# Patient Record
Sex: FEMALE | Race: WHITE | HISPANIC OR LATINO
[De-identification: names, ages, dates, MRNs, and addresses within clinical notes are randomized per-mention and may not be internally consistent; named-entity substitution may affect disease eponyms.]

---

## 2020-03-06 PROBLEM — Z00.00 ENCOUNTER FOR PREVENTIVE HEALTH EXAMINATION: Status: ACTIVE | Noted: 2020-03-06

## 2020-03-10 ENCOUNTER — TRANSCRIPTION ENCOUNTER (OUTPATIENT)
Age: 50
End: 2020-03-10

## 2020-03-10 ENCOUNTER — APPOINTMENT (OUTPATIENT)
Dept: ORTHOPEDIC SURGERY | Facility: CLINIC | Age: 50
End: 2020-03-10
Payer: COMMERCIAL

## 2020-03-10 VITALS
HEART RATE: 65 BPM | DIASTOLIC BLOOD PRESSURE: 79 MMHG | BODY MASS INDEX: 29.16 KG/M2 | HEIGHT: 65 IN | WEIGHT: 175 LBS | SYSTOLIC BLOOD PRESSURE: 131 MMHG

## 2020-03-10 DIAGNOSIS — Z78.9 OTHER SPECIFIED HEALTH STATUS: ICD-10-CM

## 2020-03-10 PROCEDURE — 99203 OFFICE O/P NEW LOW 30 MIN: CPT | Mod: 25

## 2020-03-10 PROCEDURE — 20550 NJX 1 TENDON SHEATH/LIGAMENT: CPT | Mod: F2

## 2020-03-10 RX ORDER — ATORVASTATIN CALCIUM 80 MG/1
TABLET, FILM COATED ORAL
Refills: 0 | Status: ACTIVE | COMMUNITY

## 2020-03-10 RX ORDER — LIDOCAINE HYDROCHLORIDE 10 MG/ML
1 INJECTION, SOLUTION INFILTRATION; PERINEURAL
Refills: 0 | Status: COMPLETED | OUTPATIENT
Start: 2020-03-10

## 2020-03-10 RX ORDER — UBIDECARENONE/VIT E ACET 100MG-5
CAPSULE ORAL
Refills: 0 | Status: ACTIVE | COMMUNITY

## 2020-03-10 RX ORDER — AMLODIPINE BESYLATE 5 MG/1
TABLET ORAL
Refills: 0 | Status: ACTIVE | COMMUNITY

## 2020-03-10 RX ORDER — BETAMETHA AC,SOD PHOS/WATER/PF 6 MG/ML
6 (3-3) VIAL (ML) INJECTION
Qty: 1 | Refills: 0 | Status: COMPLETED | OUTPATIENT
Start: 2020-03-10

## 2020-03-10 RX ADMIN — BETAMETHASONE ACETATE AND BETAMETHASONE SODIUM PHOSPHATE 1 MG/ML: 3; 3 INJECTION, SUSPENSION INTRA-ARTICULAR; INTRALESIONAL; INTRAMUSCULAR; SOFT TISSUE at 00:00

## 2020-03-10 RX ADMIN — LIDOCAINE HYDROCHLORIDE 0.5 %: 10 INJECTION, SOLUTION INFILTRATION; PERINEURAL at 00:00

## 2020-03-10 NOTE — DISCUSSION/SUMMARY
[FreeTextEntry1] : She has findings consistent with a left middle finger trigger finger.  \par \par I had a discussion regarding today's visit, the diagnosis, and treatment recommendations / options.  I recommended a cortisone injection today.\par \par The patient has agreed to this plan of management and has expressed full understanding.  All questions were fully answered to the patient's satisfaction.\par \par Over 50% of the time spent with the patient was on counseling the patient on the above diagnosis, treatment plan and prognosis.

## 2020-03-10 NOTE — HISTORY OF PRESENT ILLNESS
[Left] : left hand dominant [FreeTextEntry1] : She comes in today for evaluation of left middle finger pain and triggering for the past month.  Initially this presented as pain in her shoulder which has subsequently resolved.  She is bothered by pain and triggering of the left middle finger.\par \par She was accompanied by her sister today.

## 2020-03-10 NOTE — PHYSICAL EXAM
[de-identified] : - Constitutional: This is a female in no obvious distress.  She was accompanied by her sister today.\par - Psych: Patient is alert and oriented to person, place and time.  Patient has a normal mood and affect.\par - Cardiovascular: Normal pulses throughout the upper extremities.  No significant varicosities are noted in the upper extremities. \par - Neuro: Strength and sensation are intact throughout the upper extremities.  Patient has normal coordination.\par - Respiratory:  Patient exhibits no evidence of shortness of breath or difficulty breathing.\par - Skin: No rashes, lesions, or other abnormalities are noted in the upper extremities.\par \par ---\par \par Examination of her left hand demonstrate swelling and tenderness along the A1 pulley of the middle finger.  There is locking with flexion.  She has a mild PIP joint flexion contracture and limitation of terminal flexion.  There is no triggering of the other digits.  She has full painless motion of her shoulder.  She is neurovascularly intact distally.

## 2020-03-10 NOTE — PROCEDURE
[FreeTextEntry1] : -  After a discussion of risks and benefits, the patient agreed to proceed with a cortisone injection.  \par -  Side: Left \par -  Finger: Middle finger\par -  Medications: 0.5 cc of 1% Lidocaine and 1 cc of Betamethasone, 6mg/cc, using sterile technique.\par -  Patient tolerated the procedure well, without complications.\par -  Patient was told that the symptoms may worsen for a day or two, and should then begin to improve. \par -  Instructions: Patient was instructed on activity modification for the next several days.\par -  Follow-up: Within 4 weeks to assess response to the injection.

## 2020-04-08 ENCOUNTER — APPOINTMENT (OUTPATIENT)
Dept: ORTHOPEDIC SURGERY | Facility: CLINIC | Age: 50
End: 2020-04-08

## 2020-06-19 ENCOUNTER — APPOINTMENT (OUTPATIENT)
Dept: ORTHOPEDIC SURGERY | Facility: CLINIC | Age: 50
End: 2020-06-19
Payer: COMMERCIAL

## 2020-06-19 VITALS — TEMPERATURE: 97.3 F

## 2020-06-19 PROCEDURE — 99213 OFFICE O/P EST LOW 20 MIN: CPT | Mod: 25

## 2020-06-19 PROCEDURE — 20550 NJX 1 TENDON SHEATH/LIGAMENT: CPT | Mod: F2

## 2020-06-19 RX ORDER — BETAMETHA AC,SOD PHOS/WATER/PF 6 MG/ML
6 (3-3) VIAL (ML) INJECTION
Qty: 1 | Refills: 0 | Status: COMPLETED | OUTPATIENT
Start: 2020-06-19

## 2020-06-19 RX ORDER — LIDOCAINE HYDROCHLORIDE 10 MG/ML
1 INJECTION, SOLUTION INFILTRATION; PERINEURAL
Refills: 0 | Status: COMPLETED | OUTPATIENT
Start: 2020-06-19

## 2020-06-19 RX ADMIN — LIDOCAINE HYDROCHLORIDE %: 10 INJECTION, SOLUTION INFILTRATION; PERINEURAL at 00:00

## 2020-06-19 RX ADMIN — BETAMETHASONE ACETATE AND BETAMETHASONE SODIUM PHOSPHATE MG/ML: 3; 3 INJECTION, SUSPENSION INTRA-ARTICULAR; INTRALESIONAL; INTRAMUSCULAR; SOFT TISSUE at 00:00

## 2020-06-19 NOTE — ADDENDUM
[FreeTextEntry1] : I, Malaika Kay, acted solely as a scribe for Dr. Ruggiero on this date 06/19/2020.

## 2020-06-19 NOTE — PROCEDURE
[FreeTextEntry1] : -  After a discussion of risks and benefits, the patient agreed to proceed with a cortisone injection.  \par -  Side: Left \par -  Finger: Middle finger\par -  Medications: 0.5 cc of 1% Lidocaine and 1 cc of Betamethasone, 6mg/cc, using sterile technique.\par -  Patient tolerated the procedure well, without complications.\par -  Patient was told that the symptoms may worsen for a day or two, and should then begin to improve. \par -  Instructions: Patient was instructed on activity modification for the next several days.\par -  Follow-up: According to her symptoms.

## 2020-06-19 NOTE — DISCUSSION/SUMMARY
[FreeTextEntry1] : I had a discussion regarding today's visit, the diagnosis, and treatment recommendations / options.  I recommended a repeat cortisone injection today.\par \par The patient has agreed to this plan of management and has expressed full understanding.  All questions were fully answered to the patient's satisfaction.\par \par I spent at least 20 minutes of face to face time with the patient.  Over 50% of the time spent with the patient was on counseling the patient on the above diagnosis, treatment plan and prognosis.

## 2020-06-19 NOTE — END OF VISIT
[FreeTextEntry3] : This note was written by Malaika Kay on 06/19/2020 acting solely as a scribe for Dr. Husam Ruggiero.\par  \par All medical record entries made by the Scribe were at my, Dr. Husam Ruggiero, direction and personally dictated by me on 06/19/2020. I have personally reviewed the chart and agree that the record accurately reflects my personal performance of the history, physical exam, assessment and plan.

## 2020-06-19 NOTE — HISTORY OF PRESENT ILLNESS
[FreeTextEntry1] : Greater than 3 months status post left middle finger trigger cortisone injection #1.\par \par She returns today due to recurrent pain. The initial injection had helped to alleviate her symptoms, though her pain has returned.  She describes pain along the base of her middle finger. Her symptoms are exacerbated with flexion of her fingers.

## 2020-06-19 NOTE — PHYSICAL EXAM
[de-identified] : - Constitutional: This is a female in no obvious distress.  \par - Psych: Patient is alert and oriented to person, place and time.  Patient has a normal mood and affect.\par - Cardiovascular: Normal pulses throughout the upper extremities.  No significant varicosities are noted in the upper extremities. \par - Neuro: Strength and sensation are intact throughout the upper extremities.  Patient has normal coordination.\par - Respiratory:  Patient exhibits no evidence of shortness of breath or difficulty breathing.\par - Skin: No rashes, lesions, or other abnormalities are noted in the upper extremities.\par \par ---\par \par Examination of her left hand demonstrate swelling and tenderness along the A1 pulley of the middle finger.  There is loss of terminal flexion with associated triggering.  She has a mild PIP joint flexion contracture and limitation of terminal flexion.  There is no triggering of the other digits.  She has full painless motion of her shoulder.  She is neurovascularly intact distally.

## 2022-04-25 ENCOUNTER — NON-APPOINTMENT (OUTPATIENT)
Age: 52
End: 2022-04-25

## 2022-04-27 ENCOUNTER — APPOINTMENT (OUTPATIENT)
Dept: ORTHOPEDIC SURGERY | Facility: CLINIC | Age: 52
End: 2022-04-27
Payer: COMMERCIAL

## 2022-04-27 PROCEDURE — 20550 NJX 1 TENDON SHEATH/LIGAMENT: CPT | Mod: RT,F7

## 2022-04-27 PROCEDURE — 99214 OFFICE O/P EST MOD 30 MIN: CPT | Mod: 25

## 2022-04-27 RX ORDER — BETAMETHA AC,SOD PHOS/WATER/PF 6 MG/ML
6 (3-3) VIAL (ML) INJECTION
Qty: 1 | Refills: 0 | Status: COMPLETED | OUTPATIENT
Start: 2022-04-27

## 2022-04-27 RX ORDER — LIDOCAINE HYDROCHLORIDE 10 MG/ML
1 INJECTION, SOLUTION INFILTRATION; PERINEURAL
Refills: 0 | Status: COMPLETED | OUTPATIENT
Start: 2022-04-27

## 2022-04-27 RX ADMIN — LIDOCAINE HYDROCHLORIDE 0.5 %: 10 INJECTION, SOLUTION INFILTRATION; PERINEURAL at 00:00

## 2022-04-27 RX ADMIN — BETAMETHASONE SODIUM PHOSPHATE AND BETAMETHASONE ACETATE 1 MG/ML: 3; 3 INJECTION, SUSPENSION INTRA-ARTICULAR; INTRALESIONAL; INTRAMUSCULAR; SOFT TISSUE at 00:00

## 2022-04-27 NOTE — PHYSICAL EXAM
[de-identified] : - Constitutional: This is a female in no obvious distress.  \par - Psych: Patient is alert and oriented to person, place and time.  Patient has a normal mood and affect.\par - Cardiovascular: Normal pulses throughout the upper extremities.  No significant varicosities are noted in the upper extremities. \par - Neuro: Strength and sensation are intact throughout the upper extremities.  Patient has normal coordination.\par - Respiratory:  Patient exhibits no evidence of shortness of breath or difficulty breathing.\par - Skin: No rashes, lesions, or other abnormalities are noted in the upper extremities.\par \par ---\par \par -  Side: Right\par -  Finger: Middle trigger finger\par -  There is swelling and tenderness along the A-1 pulley of the digit.  \par -  There is evidence of active triggering with flexion and extension of the digit.\par -  There is no evidence of a PIP joint flexion contracture.  \par -  There is some limitation of terminal flexion of the middle finger into the palm.\par -  There is no evidence of triggering of the adjacent digits.  \par -  Provocative signs for carpal tunnel syndrome are negative.  \par -  There is normal strength and sensation distally along the radial, ulnar and median nerve distributions.  \par -  There is full composite range-of-motion of the other digits into the palm.

## 2022-04-27 NOTE — END OF VISIT
[FreeTextEntry3] : This note was written by Malaika Voss on 04/27/2022 acting solely as a scribe for Dr. Husam Ruggiero.\par  \par All medical record entries made by the Scribe were at my, Dr. Husam Ruggiero, direction and personally dictated by me on 04/27/2022. I have personally reviewed the chart and agree that the record accurately reflects my personal performance of the history, physical exam, assessment and plan.

## 2022-04-27 NOTE — DISCUSSION/SUMMARY
[FreeTextEntry1] : I had a discussion regarding today's visit, the diagnosis and treatment recommendations and options.  We also discussed changes since the last visit.  She has developed a right middle finger trigger finger.\par \par At this time, she agreed to proceed with a cortisone injection to the flexor tendon sheath of the right middle finger. \par \par The patient has agreed to the above plan of management and has expressed full understanding.  All questions were fully answered to the patient's satisfaction.\par \par My cumulative time spent on today's visit was greater than 30 minutes and included: Preparation for the visit, review of the medical records, review of pertinent diagnostic studies, examination and counseling of the patient on the above diagnosis, treatment plan and prognosis, orders of diagnostic tests, medications and/or appropriate procedures and documentation in the medical records of today's visit.

## 2022-04-27 NOTE — HISTORY OF PRESENT ILLNESS
[FreeTextEntry1] : 22 1/2 months status post left middle finger trigger cortisone injection #2. She is doing well in this regard\par \par She returns today with a new complaint of right middle finger triggering and locking as of 3 months ago. She is concerned that the triggering was caused when an end table in a hotel fell on her hand. She rats her pain as a 7 out of 10 at this time.

## 2022-04-27 NOTE — PROCEDURE
[FreeTextEntry1] : -  After a discussion of risks and benefits, the patient agreed to proceed with a cortisone injection.  \par -  Side: Right\par -  Finger: Middle trigger finger\par -  Medications: 0.5 cc of 1% Lidocaine and 1 cc of betamethasone, using sterile technique.\par -  Patient tolerated the procedure well, without complications.\par -  Patient was told that the symptoms may worsen for a day or two, and should then begin to improve. \par -  Instructions: Patient was instructed on activity modification for the next several days.\par -  Follow-up: Within 4 weeks to assess response to the injection.

## 2022-04-27 NOTE — ADDENDUM
[FreeTextEntry1] : I, Malaika Voss, acted solely as a scribe for Dr. Ruggiero on this date on 04/27/2022.

## 2022-05-13 ENCOUNTER — NON-APPOINTMENT (OUTPATIENT)
Age: 52
End: 2022-05-13

## 2022-05-25 ENCOUNTER — APPOINTMENT (OUTPATIENT)
Dept: ORTHOPEDIC SURGERY | Facility: CLINIC | Age: 52
End: 2022-05-25

## 2023-05-17 ENCOUNTER — NON-APPOINTMENT (OUTPATIENT)
Age: 53
End: 2023-05-17

## 2023-05-17 ENCOUNTER — APPOINTMENT (OUTPATIENT)
Dept: ORTHOPEDIC SURGERY | Facility: CLINIC | Age: 53
End: 2023-05-17
Payer: COMMERCIAL

## 2023-05-17 DIAGNOSIS — M18.12 UNILATERAL PRIMARY OSTEOARTHRITIS OF FIRST CARPOMETACARPAL JOINT, LEFT HAND: ICD-10-CM

## 2023-05-17 PROCEDURE — 73130 X-RAY EXAM OF HAND: CPT | Mod: LT

## 2023-05-17 PROCEDURE — 73110 X-RAY EXAM OF WRIST: CPT | Mod: LT

## 2023-05-17 PROCEDURE — 99214 OFFICE O/P EST MOD 30 MIN: CPT

## 2023-05-17 RX ORDER — MELOXICAM 15 MG/1
15 TABLET ORAL DAILY
Qty: 20 | Refills: 0 | Status: ACTIVE | COMMUNITY
Start: 2023-05-17 | End: 1900-01-01

## 2023-05-17 NOTE — DISCUSSION/SUMMARY
[FreeTextEntry1] : I had a discussion regarding today's visit, the diagnosis and treatment recommendations and options.  We also discussed changes since the last visit.  With regard to the left thumb, she has inflammation of the CMC joint with very minimal degenerative changes on radiographs.  I recommended she begin a course of Mobic 15 mg once daily with meals, for the next 1 to 2 weeks. I warned about potential GI side effects. If she is no better with time, we did discuss the option of a cortisone injection at the left thumb CMC joint. She will follow up in 3 to 4 weeks in this regard.\par \par Finally, I did tell her that based upon her exam, it does appear as if she may have a pinched nerve in her cervical spine. I explained to her, that I am not a spine specialist and would defer to one of my partners, either Dr. Pierce or Dr. Briggs for further evaluation in this regard.\par \par The patient has agreed to the above plan of management and has expressed full understanding.  All questions were fully answered to the patient's satisfaction.\par \par My cumulative time spent on today's visit included: Preparation for the visit, review of the medical records, review of pertinent diagnostic studies, examination and counseling of the patient on the above diagnosis, treatment plan and prognosis, orders of diagnostic tests, medications and/or appropriate procedures and documentation in the medical records of today's visit.

## 2023-05-17 NOTE — END OF VISIT
[FreeTextEntry3] : This note was written by Malaika Voss on 05/17/2023 acting solely as a scribe for Dr. Husam Ruggiero.\par  \par All medical record entries made by the Scribe were at my, Dr. Husam Ruggiero, direction and personally dictated by me on 05/17/2023. I have personally reviewed the chart and agree that the record accurately reflects my personal performance of the history, physical exam, assessment and plan.

## 2023-05-17 NOTE — PHYSICAL EXAM
[de-identified] : - Constitutional: This is a female in no obvious distress.  \par - Psych: Patient is alert and oriented to person, place and time.  Patient has a normal mood and affect.\par - Cardiovascular: Normal pulses throughout the upper extremities.  No significant varicosities are noted in the upper extremities. \par - Neuro: Strength and sensation are intact throughout the upper extremities.  Patient has normal coordination.\par - Respiratory:  Patient exhibits no evidence of shortness of breath or difficulty breathing.\par - Skin: No rashes, lesions, or other abnormalities are noted in the upper extremities.\par \par ---\par \par Examination of her left wrist and hand demonstrates mild swelling and tenderness along the CMC joint of the thumb.  There is no evidence of a trigger thumb or de Quervain's tendinitis.  There is no triggering of the other digits.  She is neurovascular intact distally.\par \par Emanation of the right hand demonstrates no swelling or tenderness along the A1 pulley of the middle finger.  There is very mild triggering but she states that this does not bother her.  There is no triggering of the other digits.  She is neurovascularly intact distally.\par \par Examination of her cervical spine demonstrates complaints of left paraspinal tenderness.  She has pain with extension and lateral bending.  There are no radicular symptoms elicited. [de-identified] : PA, lateral, and oblique radiographs of the left wrist and hand demonstrate minimal changes at the trapezium, at the CMC joint.

## 2023-05-17 NOTE — HISTORY OF PRESENT ILLNESS
[FreeTextEntry1] : Almost 12 months status post right middle finger trigger cortisone injection #1.  She has been previously given 2 cortisone injections at her left middle finger trigger finger.\par \par She returns today, with left upper extremity. She denies numbness and tingling. She notes radiating pain from her hand to her shoulder, which at times extends into her cervical spine. Overall, her symptoms are worse with use of the upper extremity. She is not currently taking medication for pain. \par \par She reports improvements in her right middle finger trigger finger. There is residual clicking but she states it is not bothersome.

## 2023-05-17 NOTE — ADDENDUM
[FreeTextEntry1] : I, Malaika Voss, acted solely as a scribe for Dr. Ruggiero on this date on 05/17/2023.

## 2023-06-14 ENCOUNTER — APPOINTMENT (OUTPATIENT)
Dept: ORTHOPEDIC SURGERY | Facility: CLINIC | Age: 53
End: 2023-06-14
Payer: COMMERCIAL

## 2023-06-14 VITALS — DIASTOLIC BLOOD PRESSURE: 80 MMHG | SYSTOLIC BLOOD PRESSURE: 122 MMHG | HEART RATE: 71 BPM

## 2023-06-14 PROCEDURE — 72050 X-RAY EXAM NECK SPINE 4/5VWS: CPT

## 2023-06-14 PROCEDURE — 99215 OFFICE O/P EST HI 40 MIN: CPT

## 2023-06-14 RX ORDER — METHYLPREDNISOLONE 4 MG/1
4 TABLET ORAL
Qty: 1 | Refills: 0 | Status: ACTIVE | COMMUNITY
Start: 2023-06-14 | End: 1900-01-01

## 2023-06-14 NOTE — DISCUSSION/SUMMARY
[Medication Risks Reviewed] : Medication risks reviewed [1 Week] : in 1 week [de-identified] : I expressed to the patient that based on her X-Ray findings, her symptoms are consistent with  a pinched nerve in her neck. I explained to the patient that it would be best to try a Medrol Dose Pack to aid her current symptoms. If the patient tolerates the medication well, we will follow the medication with physical therapy. If the patient does not find improvement with the medication, we will further obtain additional studies in the form of an MRI. The patient should follow-up with me in one week.

## 2023-06-14 NOTE — ADDENDUM
[FreeTextEntry1] : I, Raffaele Myron Tucker, acted solely as a scribe for Dr. Jose Raul Briggs on this date 06/14/2023 .\par \par All medical record entries made by the Scribe were at my, Dr. Jose Raul Briggs, direction and personally dictated by me on 06/14/2023 . I have reviewed the chart and agree that the record accurately reflects my personal performance of the history, physical exam, assessment and plan. I have also personally directed, reviewed, and agreed with the chart.

## 2023-06-14 NOTE — PHYSICAL EXAM
[UE/LE] : Sensory: Intact in bilateral upper & lower extremities [Bicep] : biceps 2+ and symmetric bilaterally [B.R.] : biceps 2+ and symmetric bilaterally [Tricep] : triceps 2+ and symmetric bilaterally [Knee] : patellar 2+ and symmetric bilaterally [Ankle] : ankle 2+ and symmetric bilaterally [Normal RUE] : Right Upper Extremity: No scars, rashes, lesions, ulcers, skin intact [Normal LUE] : Left Upper Extremity: No scars, rashes, lesions, ulcers, skin intact [Obese] : not obese [Normal] : Oriented to person, place, and time, insight and judgement were intact and the affect was normal [de-identified] : Full ROM to the cervical spine in flexion Extension Rotation and lateral tilting.  There is no tenderness to palpation of the trapezius muscles bilaterally.\par  [de-identified] : AP & Lateral Flexion Extension X-Rays of the Cervical Spine performed on 06/14/2023 shows Straightening of cervical lordosis. Flexion Extension shows no instability. Significant Degenerative Changes in C5-6 disc.

## 2023-06-14 NOTE — HISTORY OF PRESENT ILLNESS
[___ mths] : [unfilled] month(s) ago [Constant] : ~He/She~ states the symptoms seem to be constant [de-identified] : A 52 year old female presents an initial visit for neck pain. Patient of Dr. Ruggiero. The patient reports no incident or related injury. The patient has been experiencing severe pain to her neck with radicular symptoms to her left arm.  The patient is left-hand dominant.  She has symptoms of numbness, tingling, & weakness to her left arm and extending down to the left leg. The patient also experiences constant headaches. The patient currently takes Excedrin to aid her current symptoms.

## 2023-06-21 ENCOUNTER — APPOINTMENT (OUTPATIENT)
Dept: ORTHOPEDIC SURGERY | Facility: CLINIC | Age: 53
End: 2023-06-21
Payer: COMMERCIAL

## 2023-06-21 VITALS — SYSTOLIC BLOOD PRESSURE: 137 MMHG | HEART RATE: 76 BPM | DIASTOLIC BLOOD PRESSURE: 84 MMHG

## 2023-06-21 PROCEDURE — 99214 OFFICE O/P EST MOD 30 MIN: CPT

## 2023-06-21 NOTE — ADDENDUM
[FreeTextEntry1] : I, Raffaele Myron Tucker, acted solely as a scribe for Dr. Jose Raul Briggs on this date 06/21/2023 .\par \par All medical record entries made by the Scribe were at my, Dr. Jose Raul Briggs, direction and personally dictated by me on 06/21/2023 . I have reviewed the chart and agree that the record accurately reflects my personal performance of the history, physical exam, assessment and plan. I have also personally directed, reviewed, and agreed with the chart.

## 2023-06-21 NOTE — HISTORY OF PRESENT ILLNESS
[Improving] : improving [0] : a current pain level of 0/10 [de-identified] : Patient presents for a follow-up visit for neck pain with radiculopathy. The patient states that she has seen improvement to her symptoms with the completion of the Medrol Dose pack. The patient denies any symptoms of numbness, tingling, or weakness. The patient reports no other modifying factors at this time.

## 2023-06-21 NOTE — PHYSICAL EXAM
[UE/LE] : Sensory: Intact in bilateral upper & lower extremities [Normal] : Oriented to person, place, and time, insight and judgement were intact and the affect was normal [Obese] : not obese [de-identified] : Full ROM to the cervical spine in flexion Extension Rotation and lateral tilting.  There is no tenderness to palpation of the trapezius muscles bilaterally.\par

## 2023-06-21 NOTE — DISCUSSION/SUMMARY
[PRN] : PRN [de-identified] : I discussed the underlying pathophysiology of the patient's condition in great detail with the patient. I expressed to the patient that if she has seen improvement with the Medrol Dose Pack, she should look towards following the medication with physical therapy.  Activity modifications were reviewed with the patient at length. I highly recommended that the patient starts a course of physical therapy at this time. I provided the patient with a detailed prescription for physical therapy. The patient should follow-up with me as needed.

## 2024-06-16 ENCOUNTER — NON-APPOINTMENT (OUTPATIENT)
Age: 54
End: 2024-06-16

## 2024-06-17 ENCOUNTER — APPOINTMENT (OUTPATIENT)
Dept: ORTHOPEDIC SURGERY | Facility: CLINIC | Age: 54
End: 2024-06-17
Payer: COMMERCIAL

## 2024-06-17 VITALS
BODY MASS INDEX: 25.99 KG/M2 | DIASTOLIC BLOOD PRESSURE: 83 MMHG | SYSTOLIC BLOOD PRESSURE: 120 MMHG | WEIGHT: 156 LBS | HEIGHT: 65 IN | HEART RATE: 68 BPM

## 2024-06-17 DIAGNOSIS — M54.12 RADICULOPATHY, CERVICAL REGION: ICD-10-CM

## 2024-06-17 DIAGNOSIS — M54.2 CERVICALGIA: ICD-10-CM

## 2024-06-17 PROCEDURE — 99214 OFFICE O/P EST MOD 30 MIN: CPT

## 2024-06-17 PROCEDURE — 72050 X-RAY EXAM NECK SPINE 4/5VWS: CPT

## 2024-06-17 NOTE — DISCUSSION/SUMMARY
[Medication Risks Reviewed] : Medication risks reviewed [Surgical risks reviewed] : Surgical risks reviewed [de-identified] : Referring patient to Dr Ruggiero for positive Phalen's test to the left wrist. Physical therapy prescription provided to patient today for cervical spine strengthening and stretching exercises.  I, Dr. Jose Raul Briggs, personally performed the evaluation and management (E/M) services for this new patient.  That E/M includes conducting the initial examination, assessing all conditions, and establishing a plan of care.  Today, my ACP, Edita Fong, was here to observe my evaluation and management services for this patient to be followed going forward.

## 2024-06-17 NOTE — HISTORY OF PRESENT ILLNESS
[Pain Location] : pain [] : left ring finger [C-Spine] : C-spine region [Worsening] : worsening [___ wks] : [unfilled] week(s) ago [7] : a current pain level of 7/10 [Sitting] : sitting [Daily] : ~He/She~ states the symptoms seem to be occuring daily [Prolonged Sitting] : worsened by prolonged sitting [NSAIDs] : relieved by nonsteroidal anti-inflammatory drugs [de-identified] : Patient presents a follow-up visit for Left sided cervical pain with a new onset of left arm pain and numbness to the middle finger and right finger and radiculopathy of the shoulder to the left elbow. Patient is left dominant, no weakness and no dropping of items.

## 2024-06-17 NOTE — PHYSICAL EXAM
[ALL] : Biceps, brachioradialis, triceps, patellar, ankle and plantar 2+ and symmetric bilaterally [Normal] : No costovertebral angle tenderness and no spinal tenderness [UE/LE] : Sensory: Intact in bilateral upper & lower extremities [Normal RUE] : Right Upper Extremity: No scars, rashes, lesions, ulcers, skin intact [Normal LUE] : Left Upper Extremity: No scars, rashes, lesions, ulcers, skin intact [de-identified] : positive Phalen's test to the left wrist. [de-identified] : AP & Lateral Flexion Extension X-Rays 4 Views of the Cervical Spine performed on 06/17/2024 shows multilevel DDD of the cervical spine most marked at C 5-6.

## 2024-06-26 PROBLEM — M65.332 TRIGGER MIDDLE FINGER OF LEFT HAND: Status: ACTIVE | Noted: 2020-03-10

## 2024-06-26 PROBLEM — M65.331 TRIGGER MIDDLE FINGER OF RIGHT HAND: Status: ACTIVE | Noted: 2022-04-27

## 2024-06-28 ENCOUNTER — APPOINTMENT (OUTPATIENT)
Dept: ORTHOPEDIC SURGERY | Facility: CLINIC | Age: 54
End: 2024-06-28
Payer: COMMERCIAL

## 2024-06-28 DIAGNOSIS — M65.322 TRIGGER FINGER, LEFT INDEX FINGER: ICD-10-CM

## 2024-06-28 DIAGNOSIS — M65.332 TRIGGER FINGER, LEFT MIDDLE FINGER: ICD-10-CM

## 2024-06-28 DIAGNOSIS — M65.331 TRIGGER FINGER, RIGHT MIDDLE FINGER: ICD-10-CM

## 2024-06-28 PROCEDURE — 99214 OFFICE O/P EST MOD 30 MIN: CPT | Mod: 25

## 2024-06-28 PROCEDURE — 20550 NJX 1 TENDON SHEATH/LIGAMENT: CPT | Mod: F2
